# Patient Record
Sex: FEMALE | Race: WHITE | NOT HISPANIC OR LATINO | URBAN - METROPOLITAN AREA
[De-identification: names, ages, dates, MRNs, and addresses within clinical notes are randomized per-mention and may not be internally consistent; named-entity substitution may affect disease eponyms.]

---

## 2020-08-27 ENCOUNTER — TELEPHONE ENCOUNTER (OUTPATIENT)
Dept: URBAN - METROPOLITAN AREA CLINIC 92 | Facility: CLINIC | Age: 85
End: 2020-08-27

## 2021-02-22 ENCOUNTER — OUT OF OFFICE VISIT (OUTPATIENT)
Dept: URBAN - METROPOLITAN AREA MEDICAL CENTER 24 | Facility: MEDICAL CENTER | Age: 86
End: 2021-02-22
Payer: MEDICARE

## 2021-02-22 DIAGNOSIS — K86.89 ATROPHIC PANCREAS: ICD-10-CM

## 2021-02-22 DIAGNOSIS — R11.0 CHRONIC NAUSEA: ICD-10-CM

## 2021-02-22 DIAGNOSIS — R93.3 ABN FINDINGS-GI TRACT: ICD-10-CM

## 2021-02-22 DIAGNOSIS — N39.0 CHRONIC UTI: ICD-10-CM

## 2021-02-22 PROCEDURE — 99222 1ST HOSP IP/OBS MODERATE 55: CPT | Performed by: PHYSICIAN ASSISTANT

## 2021-02-22 PROCEDURE — G8427 DOCREV CUR MEDS BY ELIG CLIN: HCPCS | Performed by: PHYSICIAN ASSISTANT

## 2021-06-17 ENCOUNTER — DASHBOARD ENCOUNTERS (OUTPATIENT)
Age: 86
End: 2021-06-17

## 2021-06-17 ENCOUNTER — OFFICE VISIT (OUTPATIENT)
Dept: URBAN - METROPOLITAN AREA CLINIC 82 | Facility: CLINIC | Age: 86
End: 2021-06-17
Payer: MEDICARE

## 2021-06-17 VITALS
SYSTOLIC BLOOD PRESSURE: 137 MMHG | DIASTOLIC BLOOD PRESSURE: 83 MMHG | TEMPERATURE: 97.3 F | HEIGHT: 62 IN | HEART RATE: 71 BPM

## 2021-06-17 DIAGNOSIS — R93.5 ABNORMAL CT OF THE ABDOMEN: ICD-10-CM

## 2021-06-17 PROCEDURE — 99214 OFFICE O/P EST MOD 30 MIN: CPT | Performed by: INTERNAL MEDICINE

## 2021-06-17 NOTE — HPI-OTHER HISTORIES
88-year-old female with history of hypertension A. fib and a pacemaker with chronic kidney disease.  Has a nausea and vomiting.  Patient was seen in February.  CT abdomen was showing mild bilateral dilatation of prominent pancreatic duct and atrophy of the distal pancreas suggesting IPMN CT shows extensive diverticulosis.  MRI was ordered but could not be done because of patient's pacemaker CT scan was done showing minimal scarring in the lung base.  There was a pacemaker leads in the right heart.  Small hiatal hernia.  Gallbladder was resected.  There is a mild dilatation of the main pancreatic duct with atrophy of the pancreatic tail.  Findings could reflect an IPMN consider further imaging with MRI.

## 2021-06-17 NOTE — HPI-TODAY'S VISIT:
mr briceño here  [facility] , problem with pancreas, [facility], dr harris, dr pinzon [facility] , feb 28ths , and stayed 3 days  refered here, for cancer ?? pancreas  sometimes east sometheing, for few seconds some pain later  bloated sometimes  stomach swollen.  hurt back, slouch forward  feel okay.  eat good, bm good, no bleeding, no wt loss.

## 2021-06-29 ENCOUNTER — LAB OUTSIDE AN ENCOUNTER (OUTPATIENT)
Dept: URBAN - METROPOLITAN AREA CLINIC 92 | Facility: CLINIC | Age: 86
End: 2021-06-29

## 2021-06-29 ENCOUNTER — TELEPHONE ENCOUNTER (OUTPATIENT)
Dept: URBAN - METROPOLITAN AREA CLINIC 92 | Facility: CLINIC | Age: 86
End: 2021-06-29

## 2021-07-27 ENCOUNTER — TELEPHONE ENCOUNTER (OUTPATIENT)
Dept: URBAN - METROPOLITAN AREA CLINIC 82 | Facility: CLINIC | Age: 86
End: 2021-07-27